# Patient Record
(demographics unavailable — no encounter records)

---

## 2025-05-23 NOTE — HISTORY OF PRESENT ILLNESS
[de-identified] : 18yo female presents complaining of right thigh pain for 3 to 4 weeks.  No specific injuries or trauma.  She states she has been running for years.  She runs at least 5 days/week, 5 miles at a time.  She reports pain mid aspect of her thigh that starts almost immediately and continues throughout her run.  This lingers for several days sometimes weeks later.  When she runs increased activity this makes this worse.  The last time she ran was about 5 to 6 days ago.  She is here today for further orthopedic evaluation.  Denies numbness tingling

## 2025-05-23 NOTE — DISCUSSION/SUMMARY
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

## 2025-05-23 NOTE — PHYSICAL EXAM
[de-identified] : General Exam  Well developed, well nourished No apparent distress Oriented to person, place, and time Mood: Normal Affect: Normal Balance and coordination: Normal Gait: Normal  Right hip/thigh exam  Skin: Clean/dry and intact Inspection: mild ttp mid aspect femur, No obvious deformity, no swelling, no ecchymosis. Tenderness:  no tenderness over greater trochanter/glut medius insertion. No tenderness pubic symphysis, pubic tubercle, hip flexors. No ttp ischial tuberosity or buttock. No ttp over the ASIS/Illiac crest. ROM: 0-120. Internal rotation 30 external rotation 70 Painful ROM: None Strength: 5/5 hip flexion/ADD/ABD/Q/H/TA/GS/EHL Neuro: Sensation in tact to light touch throughout in dp/sp/tib/hira/saph distributions Pulses: 2+ DP/PT pulses [de-identified] : The following radiographs were ordered and read by me during this patients visit. I reviewed each radiograph in detail with the patient and discussed the findings as highlighted below.  2 views right femur were obtained today cortices are intact hip and knee joint spaces well-maintained

## 2025-06-13 NOTE — DISCUSSION/SUMMARY
[de-identified] : Right femur stress injury options discussed.  We discussed the importance of refraining from impact activity to avoid risk of progression to stress fracture she voiced understanding with this she is doing minimal walking at this time to do nonimpact activities with her  she is aware to let us know immediately if her pain worsens she expressed understanding with this.  All questions were answered

## 2025-06-13 NOTE — HISTORY OF PRESENT ILLNESS
[de-identified] : 20yo female presents complaining of right thigh pain for 3 to 4 weeks.  No specific injuries or trauma.  She states she has been running for years.  She runs at least 5 days/week, 5 miles at a time.  She reports pain mid aspect of her thigh that starts almost immediately and continues throughout her run.  This lingers for several days sometimes weeks later.  When she runs increased activity this makes this worse.  The last time she ran was about 5 to 6 days ago.  She is here today for further orthopedic evaluation.  Denies numbness tingling  She has decreased her activity since the last visit she stopped running completely her pain has resolved she is continue to work with her  but is not doing impact based activity

## 2025-06-13 NOTE — HISTORY OF PRESENT ILLNESS
[de-identified] : 18yo female presents complaining of right thigh pain for 3 to 4 weeks.  No specific injuries or trauma.  She states she has been running for years.  She runs at least 5 days/week, 5 miles at a time.  She reports pain mid aspect of her thigh that starts almost immediately and continues throughout her run.  This lingers for several days sometimes weeks later.  When she runs increased activity this makes this worse.  The last time she ran was about 5 to 6 days ago.  She is here today for further orthopedic evaluation.  Denies numbness tingling  She has decreased her activity since the last visit she stopped running completely her pain has resolved she is continue to work with her  but is not doing impact based activity

## 2025-06-13 NOTE — DISCUSSION/SUMMARY
[de-identified] : Right femur stress injury options discussed.  We discussed the importance of refraining from impact activity to avoid risk of progression to stress fracture she voiced understanding with this she is doing minimal walking at this time to do nonimpact activities with her  she is aware to let us know immediately if her pain worsens she expressed understanding with this.  All questions were answered

## 2025-06-13 NOTE — PHYSICAL EXAM
[de-identified] : Right hip/thigh exam  Skin: Clean/dry and intact Inspection: mild ttp mid aspect femur, No obvious deformity, no swelling, no ecchymosis. Tenderness:  no tenderness over greater trochanter/glut medius insertion. No tenderness pubic symphysis, pubic tubercle, hip flexors. No ttp ischial tuberosity or buttock. No ttp over the ASIS/Illiac crest. ROM: 0-120. Internal rotation 30 external rotation 70 Painful ROM: None Strength: 5/5 hip flexion/ADD/ABD/Q/H/TA/GS/EHL Neuro: Sensation in tact to light touch throughout in dp/sp/tib/hira/saph distributions Pulses: 2+ DP/PT pulses [de-identified] : The following radiographs were ordered and read by me during this patients visit. I reviewed each radiograph in detail with the patient and discussed the findings as highlighted below.  2 views right femur and 2 views of the right hip were obtained today cortices are intact hip and knee joint spaces well-maintained there is no thickening of the cortices and no lucency   MRI/Radiographs of the right thigh obtained outside were reviewed by me today mild one marrow edema in the medullary cavity of the proximal femur with associated mild adjacent soft tissue edema.

## 2025-06-13 NOTE — PHYSICAL EXAM
[de-identified] : Right hip/thigh exam  Skin: Clean/dry and intact Inspection: mild ttp mid aspect femur, No obvious deformity, no swelling, no ecchymosis. Tenderness:  no tenderness over greater trochanter/glut medius insertion. No tenderness pubic symphysis, pubic tubercle, hip flexors. No ttp ischial tuberosity or buttock. No ttp over the ASIS/Illiac crest. ROM: 0-120. Internal rotation 30 external rotation 70 Painful ROM: None Strength: 5/5 hip flexion/ADD/ABD/Q/H/TA/GS/EHL Neuro: Sensation in tact to light touch throughout in dp/sp/tib/hira/saph distributions Pulses: 2+ DP/PT pulses [de-identified] : The following radiographs were ordered and read by me during this patients visit. I reviewed each radiograph in detail with the patient and discussed the findings as highlighted below.  2 views right femur and 2 views of the right hip were obtained today cortices are intact hip and knee joint spaces well-maintained there is no thickening of the cortices and no lucency   MRI/Radiographs of the right thigh obtained outside were reviewed by me today mild one marrow edema in the medullary cavity of the proximal femur with associated mild adjacent soft tissue edema.